# Patient Record
Sex: FEMALE | Race: WHITE | Employment: STUDENT | URBAN - METROPOLITAN AREA
[De-identification: names, ages, dates, MRNs, and addresses within clinical notes are randomized per-mention and may not be internally consistent; named-entity substitution may affect disease eponyms.]

---
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
## 2024-12-23 ENCOUNTER — OFFICE VISIT (OUTPATIENT)
Age: 19
End: 2024-12-23

## 2024-12-23 VITALS
BODY MASS INDEX: 30.36 KG/M2 | OXYGEN SATURATION: 98 % | HEART RATE: 100 BPM | DIASTOLIC BLOOD PRESSURE: 72 MMHG | WEIGHT: 165 LBS | SYSTOLIC BLOOD PRESSURE: 122 MMHG | TEMPERATURE: 97.5 F | HEIGHT: 62 IN

## 2024-12-23 DIAGNOSIS — R30.0 DYSURIA: ICD-10-CM

## 2024-12-23 DIAGNOSIS — N39.0 URINARY TRACT INFECTION WITHOUT HEMATURIA, SITE UNSPECIFIED: Primary | ICD-10-CM

## 2024-12-23 LAB
BILIRUBIN, POC: NEGATIVE
BLOOD URINE, POC: NORMAL
CLARITY, POC: CLEAR
COLOR, POC: YELLOW
GLUCOSE URINE, POC: NEGATIVE MG/DL
KETONES, POC: NEGATIVE MG/DL
LEUKOCYTE EST, POC: NORMAL
NITRITE, POC: NEGATIVE
PH, POC: 5.5
PROTEIN, POC: NEGATIVE MG/DL
SPECIFIC GRAVITY, POC: >=1.03
UROBILINOGEN, POC: 0.2 MG/DL

## 2024-12-23 RX ORDER — NORGESTIMATE AND ETHINYL ESTRADIOL 0.25-0.035
1 KIT ORAL DAILY
COMMUNITY

## 2024-12-23 RX ORDER — CEPHALEXIN 500 MG/1
500 CAPSULE ORAL 2 TIMES DAILY
Qty: 10 CAPSULE | Refills: 0 | Status: SHIPPED | OUTPATIENT
Start: 2024-12-23 | End: 2024-12-28

## 2024-12-23 NOTE — PROGRESS NOTES
Rimma العراقي (:  2005) is a 19 y.o. female,  here for evaluation of the following chief complaint(s): Dysuria (Pt states burns when she urinates and has urinary frequency /Since this morning/Pt states some abdominal pain but more achy)    Rimma العراقي is a New patient.   Last Urgent Care Visit: Visit date not found  I have reviewed the patient's medications; see Medication Reconciliation.    ASSESSMENT/PLAN:  Diagnosis:     ICD-10-CM    1. Urinary tract infection without hematuria, site unspecified  N39.0 cephALEXin (KEFLEX) 500 MG capsule      2. Dysuria  R30.0 POCT Urinalysis no Micro           Medical Decision Making:   Patient was seen at Urgent Care today for dysuria, increased urinary frequency, hesitancy,  x 1 day(s)    Based on history and physical examination, differential diagnosis includes but is not limited to: UTI, pyelonephritis, kidney stone, other g/u infections.     On exam pt is afebrile and not tachycardic. There is no CVA tenderness or vomiting. I do not have any concern for pyelonephritis.     POCT urine shows: + Leuks. This is indicative of UTI.  Chart reviewed. No prior cultures available for review.    Prescription(s) provided: Keflex    Patient was discharged home with follow-up and return precautions.     Patient's initial blood pressure was elevated greater than 130/80. BP was rechecked prior to discharge and is below 130/80. Therefore, referral to PCP is not required at this time.     Orders Placed This Encounter   Medications    cephALEXin (KEFLEX) 500 MG capsule     Sig: Take 1 capsule by mouth 2 times daily at 0800 and 1400 for 5 days     Dispense:  10 capsule     Refill:  0       Results:  Results for orders placed or performed in visit on 24   POCT Urinalysis no Micro   Result Value Ref Range    Color, UA yellow     Clarity, UA clear     Glucose, UA POC negative mg/dL    Bilirubin, UA negative     Ketones, UA negative mg/dL    Spec Grav, UA >=1.030     Blood, UA POC

## 2024-12-23 NOTE — PATIENT INSTRUCTIONS
Take antibiotic as prescribed.     Push fluids.     Follow-up with primary care or return if not improved.

## 2025-05-07 ENCOUNTER — APPOINTMENT (OUTPATIENT)
Dept: GENERAL RADIOLOGY | Age: 20
End: 2025-05-07
Payer: COMMERCIAL

## 2025-05-07 ENCOUNTER — HOSPITAL ENCOUNTER (EMERGENCY)
Age: 20
Discharge: HOME | End: 2025-05-07
Attending: STUDENT IN AN ORGANIZED HEALTH CARE EDUCATION/TRAINING PROGRAM
Payer: COMMERCIAL

## 2025-05-07 ENCOUNTER — APPOINTMENT (OUTPATIENT)
Dept: CT IMAGING | Age: 20
End: 2025-05-07
Payer: COMMERCIAL

## 2025-05-07 VITALS
DIASTOLIC BLOOD PRESSURE: 76 MMHG | TEMPERATURE: 98.8 F | OXYGEN SATURATION: 98 % | SYSTOLIC BLOOD PRESSURE: 119 MMHG | RESPIRATION RATE: 16 BRPM | HEIGHT: 60 IN | HEART RATE: 100 BPM | BODY MASS INDEX: 34.16 KG/M2 | WEIGHT: 174 LBS

## 2025-05-07 DIAGNOSIS — R07.9 CHEST PAIN, UNSPECIFIED TYPE: Primary | ICD-10-CM

## 2025-05-07 LAB
ALBUMIN SERPL-MCNC: 4.3 G/DL (ref 3.4–5)
ALBUMIN/GLOB SERPL: 1.2 {RATIO} (ref 1.1–2.2)
ALP SERPL-CCNC: 85 U/L (ref 40–129)
ALT SERPL-CCNC: 16 U/L (ref 10–40)
ANION GAP SERPL CALCULATED.3IONS-SCNC: 15 MMOL/L (ref 3–16)
AST SERPL-CCNC: 33 U/L (ref 15–37)
BASOPHILS # BLD: 0.1 K/UL (ref 0–0.2)
BASOPHILS NFR BLD: 0.5 %
BILIRUB SERPL-MCNC: 0.3 MG/DL (ref 0–1)
BUN SERPL-MCNC: 14 MG/DL (ref 7–20)
CALCIUM SERPL-MCNC: 9.8 MG/DL (ref 8.3–10.6)
CHLORIDE SERPL-SCNC: 102 MMOL/L (ref 99–110)
CO2 SERPL-SCNC: 21 MMOL/L (ref 21–32)
CREAT SERPL-MCNC: 0.8 MG/DL (ref 0.6–1.1)
D-DIMER QUANTITATIVE: 0.66 UG/ML FEU (ref 0–0.6)
DEPRECATED RDW RBC AUTO: 13 % (ref 12.4–15.4)
EKG ATRIAL RATE: 131 BPM
EKG ATRIAL RATE: 97 BPM
EKG DIAGNOSIS: NORMAL
EKG DIAGNOSIS: NORMAL
EKG P AXIS: 56 DEGREES
EKG P AXIS: 74 DEGREES
EKG P-R INTERVAL: 112 MS
EKG P-R INTERVAL: 150 MS
EKG Q-T INTERVAL: 364 MS
EKG Q-T INTERVAL: 396 MS
EKG QRS DURATION: 80 MS
EKG QRS DURATION: 82 MS
EKG QTC CALCULATION (BAZETT): 462 MS
EKG QTC CALCULATION (BAZETT): 584 MS
EKG R AXIS: 32 DEGREES
EKG R AXIS: 40 DEGREES
EKG T AXIS: 30 DEGREES
EKG T AXIS: 60 DEGREES
EKG VENTRICULAR RATE: 131 BPM
EKG VENTRICULAR RATE: 97 BPM
EOSINOPHIL # BLD: 0.1 K/UL (ref 0–0.6)
EOSINOPHIL NFR BLD: 0.4 %
FLUAV RNA RESP QL NAA+PROBE: NOT DETECTED
FLUBV RNA RESP QL NAA+PROBE: NOT DETECTED
GFR SERPLBLD CREATININE-BSD FMLA CKD-EPI: >90 ML/MIN/{1.73_M2}
GLUCOSE SERPL-MCNC: 98 MG/DL (ref 70–99)
HCG SERPL QL: NEGATIVE
HCT VFR BLD AUTO: 42.1 % (ref 36–48)
HGB BLD-MCNC: 14.2 G/DL (ref 12–16)
LACTATE BLDV-SCNC: 1 MMOL/L (ref 0.4–2)
LYMPHOCYTES # BLD: 3.2 K/UL (ref 1–5.1)
LYMPHOCYTES NFR BLD: 19.6 %
MAGNESIUM SERPL-MCNC: 2.16 MG/DL (ref 1.8–2.4)
MCH RBC QN AUTO: 28.6 PG (ref 26–34)
MCHC RBC AUTO-ENTMCNC: 33.7 G/DL (ref 31–36)
MCV RBC AUTO: 84.8 FL (ref 80–100)
MONOCYTES # BLD: 1.3 K/UL (ref 0–1.3)
MONOCYTES NFR BLD: 7.9 %
NEUTROPHILS # BLD: 11.6 K/UL (ref 1.7–7.7)
NEUTROPHILS NFR BLD: 71.6 %
PLATELET # BLD AUTO: 322 K/UL (ref 135–450)
PMV BLD AUTO: 6.8 FL (ref 5–10.5)
POTASSIUM SERPL-SCNC: 3.2 MMOL/L (ref 3.5–5.1)
POTASSIUM SERPL-SCNC: 3.7 MMOL/L (ref 3.5–5.1)
PROT SERPL-MCNC: 7.8 G/DL (ref 6.4–8.2)
RBC # BLD AUTO: 4.97 M/UL (ref 4–5.2)
SARS-COV-2 RNA RESP QL NAA+PROBE: NOT DETECTED
SODIUM SERPL-SCNC: 138 MMOL/L (ref 136–145)
TROPONIN, HIGH SENSITIVITY: 15 NG/L (ref 0–14)
TROPONIN, HIGH SENSITIVITY: 20 NG/L (ref 0–14)
TROPONIN, HIGH SENSITIVITY: 25 NG/L (ref 0–14)
WBC # BLD AUTO: 16.2 K/UL (ref 4–11)

## 2025-05-07 PROCEDURE — 2580000003 HC RX 258: Performed by: STUDENT IN AN ORGANIZED HEALTH CARE EDUCATION/TRAINING PROGRAM

## 2025-05-07 PROCEDURE — 84703 CHORIONIC GONADOTROPIN ASSAY: CPT

## 2025-05-07 PROCEDURE — 83605 ASSAY OF LACTIC ACID: CPT

## 2025-05-07 PROCEDURE — 96375 TX/PRO/DX INJ NEW DRUG ADDON: CPT

## 2025-05-07 PROCEDURE — 96365 THER/PROPH/DIAG IV INF INIT: CPT

## 2025-05-07 PROCEDURE — 85025 COMPLETE CBC W/AUTO DIFF WBC: CPT

## 2025-05-07 PROCEDURE — 6360000004 HC RX CONTRAST MEDICATION: Performed by: STUDENT IN AN ORGANIZED HEALTH CARE EDUCATION/TRAINING PROGRAM

## 2025-05-07 PROCEDURE — 84132 ASSAY OF SERUM POTASSIUM: CPT

## 2025-05-07 PROCEDURE — 80053 COMPREHEN METABOLIC PANEL: CPT

## 2025-05-07 PROCEDURE — 36415 COLL VENOUS BLD VENIPUNCTURE: CPT

## 2025-05-07 PROCEDURE — 71260 CT THORAX DX C+: CPT

## 2025-05-07 PROCEDURE — 83735 ASSAY OF MAGNESIUM: CPT

## 2025-05-07 PROCEDURE — 87636 SARSCOV2 & INF A&B AMP PRB: CPT

## 2025-05-07 PROCEDURE — 93005 ELECTROCARDIOGRAM TRACING: CPT | Performed by: STUDENT IN AN ORGANIZED HEALTH CARE EDUCATION/TRAINING PROGRAM

## 2025-05-07 PROCEDURE — 99285 EMERGENCY DEPT VISIT HI MDM: CPT

## 2025-05-07 PROCEDURE — 93010 ELECTROCARDIOGRAM REPORT: CPT | Performed by: INTERNAL MEDICINE

## 2025-05-07 PROCEDURE — 87040 BLOOD CULTURE FOR BACTERIA: CPT

## 2025-05-07 PROCEDURE — 6360000002 HC RX W HCPCS: Performed by: STUDENT IN AN ORGANIZED HEALTH CARE EDUCATION/TRAINING PROGRAM

## 2025-05-07 PROCEDURE — 84484 ASSAY OF TROPONIN QUANT: CPT

## 2025-05-07 PROCEDURE — 6370000000 HC RX 637 (ALT 250 FOR IP): Performed by: STUDENT IN AN ORGANIZED HEALTH CARE EDUCATION/TRAINING PROGRAM

## 2025-05-07 PROCEDURE — 85379 FIBRIN DEGRADATION QUANT: CPT

## 2025-05-07 PROCEDURE — 87150 DNA/RNA AMPLIFIED PROBE: CPT

## 2025-05-07 PROCEDURE — 71045 X-RAY EXAM CHEST 1 VIEW: CPT

## 2025-05-07 RX ORDER — KETOROLAC TROMETHAMINE 15 MG/ML
15 INJECTION, SOLUTION INTRAMUSCULAR; INTRAVENOUS ONCE
Status: COMPLETED | OUTPATIENT
Start: 2025-05-07 | End: 2025-05-07

## 2025-05-07 RX ORDER — SODIUM CHLORIDE, SODIUM LACTATE, POTASSIUM CHLORIDE, AND CALCIUM CHLORIDE .6; .31; .03; .02 G/100ML; G/100ML; G/100ML; G/100ML
1000 INJECTION, SOLUTION INTRAVENOUS ONCE
Status: COMPLETED | OUTPATIENT
Start: 2025-05-07 | End: 2025-05-07

## 2025-05-07 RX ORDER — MAGNESIUM SULFATE IN WATER 40 MG/ML
2000 INJECTION, SOLUTION INTRAVENOUS ONCE
Status: COMPLETED | OUTPATIENT
Start: 2025-05-07 | End: 2025-05-07

## 2025-05-07 RX ORDER — IOPAMIDOL 755 MG/ML
75 INJECTION, SOLUTION INTRAVASCULAR
Status: COMPLETED | OUTPATIENT
Start: 2025-05-07 | End: 2025-05-07

## 2025-05-07 RX ORDER — POTASSIUM CHLORIDE 1500 MG/1
40 TABLET, EXTENDED RELEASE ORAL ONCE
Status: COMPLETED | OUTPATIENT
Start: 2025-05-07 | End: 2025-05-07

## 2025-05-07 RX ADMIN — KETOROLAC TROMETHAMINE 15 MG: 15 INJECTION, SOLUTION INTRAMUSCULAR; INTRAVENOUS at 05:51

## 2025-05-07 RX ADMIN — SODIUM CHLORIDE, SODIUM LACTATE, POTASSIUM CHLORIDE, AND CALCIUM CHLORIDE 1000 ML: .6; .31; .03; .02 INJECTION, SOLUTION INTRAVENOUS at 05:51

## 2025-05-07 RX ADMIN — MAGNESIUM SULFATE HEPTAHYDRATE 2000 MG: 40 INJECTION, SOLUTION INTRAVENOUS at 04:29

## 2025-05-07 RX ADMIN — IOPAMIDOL 75 ML: 755 INJECTION, SOLUTION INTRAVENOUS at 06:07

## 2025-05-07 RX ADMIN — SODIUM CHLORIDE, SODIUM LACTATE, POTASSIUM CHLORIDE, AND CALCIUM CHLORIDE 1000 ML: .6; .31; .03; .02 INJECTION, SOLUTION INTRAVENOUS at 04:29

## 2025-05-07 RX ADMIN — POTASSIUM CHLORIDE 40 MEQ: 1500 TABLET, EXTENDED RELEASE ORAL at 05:11

## 2025-05-07 ASSESSMENT — PAIN SCALES - GENERAL
PAINLEVEL_OUTOF10: 4
PAINLEVEL_OUTOF10: 4
PAINLEVEL_OUTOF10: 0

## 2025-05-07 ASSESSMENT — PAIN DESCRIPTION - DESCRIPTORS
DESCRIPTORS: SHARP
DESCRIPTORS: SHARP

## 2025-05-07 ASSESSMENT — LIFESTYLE VARIABLES
HOW OFTEN DO YOU HAVE A DRINK CONTAINING ALCOHOL: NEVER
HOW MANY STANDARD DRINKS CONTAINING ALCOHOL DO YOU HAVE ON A TYPICAL DAY: PATIENT DOES NOT DRINK

## 2025-05-07 ASSESSMENT — PAIN DESCRIPTION - LOCATION
LOCATION: CHEST
LOCATION: CHEST

## 2025-05-07 ASSESSMENT — PAIN - FUNCTIONAL ASSESSMENT: PAIN_FUNCTIONAL_ASSESSMENT: 0-10

## 2025-05-07 NOTE — ED PROVIDER NOTES
Emergency Department Encounter    Patient: Rimma العراقي  MRN: [id number]  : 2005  Date of Evaluation: 2025  ED Provider:  Harjinder Floyd MD    Triage Chief Complaint:   Chest Pain (Pt comes from home. Pt states she woke up at 0300 with sharp chest pains and left arm tingly.)    [place] [employer]:  Rimma العراقي is a 19 y.o. female with history seen below presenting with complaints of chest pain with radiation of the left upper extremity.  Patient states just prior presentation she began having substernal chest pain which is moderate, constant, stabbing/pressure with radiation to left upper extremity states with radiation of pain in the left upper extremity she feels some very mild tingling in the left upper extremity.  Denies lower extremity edema, orthopnea or signs of fluid overload.  Denies history of DVTs or blood clots in the past.  Denies recent trauma or travel.  Denies recent surgeries.  States she has a Nexplanon in place.  Denies headache, blurred vision, any motor or sensory changes other than without radiation of pain in the left upper extremity she feels some mild tingling in the left upper extremity.  Denies abdominal pain, nausea vomiting, diarrhea constipation or urinary symptoms.  States last week she did have some nasal congestion and URI type symptoms.  States is improving.  States she can have very mild nasal congestion    ROS - see HPI, below listed is current ROS at time of my eval:  General:  No fevers, no chills, no weakness  Eyes:  No recent vison changes, no discharge  ENT:  No sore throat, + nasal congestion, no hearing changes  Cardiovascular:  + chest pain, no palpitations  Respiratory:  No shortness of breath, no cough, no wheezing  Gastrointestinal:  No pain, no nausea, no vomiting, no diarrhea  Musculoskeletal:  No muscle pain, no joint pain  Skin:  No rash, no pruritis, no easy bruising  Neurologic:  No speech problems, no headache, no extremity numbness, no extremity tingling,

## 2025-05-07 NOTE — ED NOTES
0742 called cardiology for stat consult ordered by Dr. Floyd for elevated trop. Shift change so cardiology will be speaking with Dr. Ash.    6838 Dr. Chairez from cardiology called and is speaking with Dr. Ash at this time.

## 2025-05-07 NOTE — ED PROVIDER NOTES
I assumed care of this patient from Dr. Floyd at shift change.  Please refer to his note for full H&P.  In short the patient is a healthy 19-year-old who presented with left-sided chest pressure radiating to the left shoulder.  On arrival she was tachycardic.  Initial EKG with a heart rate of 130 with some nonspecific T wave changes in the anterolateral leads.  Initial troponin elevated at 25.  It has been repeated twice and went to 20 and then 15.  CT of the chest is negative, no PE.  She is feeling much better after Toradol and her heart rate is normalized.  Discussed with Dr. Chairez with cardiology.  He has reviewed the EKG and is not concerned with the EKG findings.  He is reassured by her workup.  He thinks outpatient follow-up with PCP is appropriate.  I relayed this to the patient.  She states her PCP no longer takes her insurance and was just informed of this 2 weeks ago.  I will give her a referral for outpatient follow-up with primary care.  Strict return precautions given.    Results for orders placed or performed during the hospital encounter of 05/07/25   COVID-19 & Influenza Combo    Specimen: Nasopharyngeal Swab   Result Value Ref Range    SARS-CoV-2 RNA, RT PCR NOT DETECTED NOT DETECTED    Influenza A NOT DETECTED NOT DETECTED    Influenza B NOT DETECTED NOT DETECTED   CBC with Auto Differential   Result Value Ref Range    WBC 16.2 (H) 4.0 - 11.0 K/uL    RBC 4.97 4.00 - 5.20 M/uL    Hemoglobin 14.2 12.0 - 16.0 g/dL    Hematocrit 42.1 36.0 - 48.0 %    MCV 84.8 80.0 - 100.0 fL    MCH 28.6 26.0 - 34.0 pg    MCHC 33.7 31.0 - 36.0 g/dL    RDW 13.0 12.4 - 15.4 %    Platelets 322 135 - 450 K/uL    MPV 6.8 5.0 - 10.5 fL    Neutrophils % 71.6 %    Lymphocytes % 19.6 %    Monocytes % 7.9 %    Eosinophils % 0.4 %    Basophils % 0.5 %    Neutrophils Absolute 11.6 (H) 1.7 - 7.7 K/uL    Lymphocytes Absolute 3.2 1.0 - 5.1 K/uL    Monocytes Absolute 1.3 0.0 - 1.3 K/uL    Eosinophils Absolute 0.1 0.0 - 0.6 K/uL

## 2025-05-07 NOTE — DISCHARGE INSTRUCTIONS
Your lab work here was reassuring.  Your heart enzymes were slightly elevated but we repeated them and they normalized.  Your heart rate was elevated on arrival but this also improved.  The CAT scan of your chest was normal.  We do think you need to follow-up with primary care.  You likely need to have an outpatient echocardiogram.  I have given you a referral to establish care with a new primary care provider.  If you are worsening at any point or your symptoms return you should return to the ER immediately.

## 2025-05-07 NOTE — ED NOTES
First set blood cultures obtained via sterile technique without difficulty.  MD wanted repeat K level as well.  RN jeff that after blood cultures.  Soumya NICHOLS

## 2025-05-09 LAB — REPORT: NORMAL

## 2025-05-11 LAB
BACTERIA BLD CULT ORG #2: ABNORMAL
BACTERIA BLD CULT ORG #2: ABNORMAL
BACTERIA BLD CULT: NORMAL
ORGANISM: ABNORMAL
ORGANISM: ABNORMAL

## 2025-05-12 ENCOUNTER — OFFICE VISIT (OUTPATIENT)
Dept: PRIMARY CARE CLINIC | Age: 20
End: 2025-05-12

## 2025-05-12 VITALS
RESPIRATION RATE: 16 BRPM | HEIGHT: 60 IN | HEART RATE: 95 BPM | WEIGHT: 172.6 LBS | SYSTOLIC BLOOD PRESSURE: 116 MMHG | BODY MASS INDEX: 33.89 KG/M2 | OXYGEN SATURATION: 99 % | DIASTOLIC BLOOD PRESSURE: 74 MMHG

## 2025-05-12 DIAGNOSIS — R00.0 TACHYCARDIA: ICD-10-CM

## 2025-05-12 DIAGNOSIS — R94.31 PROLONGED QT INTERVAL: ICD-10-CM

## 2025-05-12 DIAGNOSIS — J35.1 SWOLLEN TONSIL: Primary | ICD-10-CM

## 2025-05-12 LAB — S PYO AG THROAT QL: NEGATIVE

## 2025-05-12 RX ORDER — ETONOGESTREL 68 MG/1
IMPLANT SUBCUTANEOUS
COMMUNITY

## 2025-05-12 SDOH — ECONOMIC STABILITY: FOOD INSECURITY: WITHIN THE PAST 12 MONTHS, YOU WORRIED THAT YOUR FOOD WOULD RUN OUT BEFORE YOU GOT MONEY TO BUY MORE.: NEVER TRUE

## 2025-05-12 SDOH — ECONOMIC STABILITY: FOOD INSECURITY: WITHIN THE PAST 12 MONTHS, THE FOOD YOU BOUGHT JUST DIDN'T LAST AND YOU DIDN'T HAVE MONEY TO GET MORE.: NEVER TRUE

## 2025-05-12 ASSESSMENT — PATIENT HEALTH QUESTIONNAIRE - PHQ9
SUM OF ALL RESPONSES TO PHQ QUESTIONS 1-9: 0
1. LITTLE INTEREST OR PLEASURE IN DOING THINGS: NOT AT ALL
SUM OF ALL RESPONSES TO PHQ QUESTIONS 1-9: 0
2. FEELING DOWN, DEPRESSED OR HOPELESS: NOT AT ALL

## 2025-05-12 NOTE — PROGRESS NOTES
PROGRESS NOTE   [facility]                                  [address], [address], [facility] [address]         Phone: [phone]    Date of Service:  5/12/2025     Patient ID: Annita العراقي is a 19 y.o. female      Subjective:     CC: Coming in to establish care + Acute concerns    HPI    Chest Pain / Cardiac Symptoms:  This is a 19-year-old female presenting for a ER followup where she presented with acute substernal chest pressure, radiating to the left shoulder and upper extremity, associated with mild tingling. The pain was described as moderate, constant, and pressure-like. No associated palpitations, shortness of breath, dizziness, or syncope were reported. On ED arrival, she was found to be tachycardic (). Initial EKG showed sinus tachycardia with a prolonged QTc (584 ms) and nonspecific ST/T wave abnormalities. Repeat EKG showed normalized heart rate and improved QTc (462 ms), with resolution of prior nonspecific changes.    Troponin Elevation:  Serial high-sensitivity troponins were elevated, starting at 25 ng/L ? 20 ng/L ? 15 ng/L (normal range 0-14 ng/L). Downtrend is reassuring and suggests mild myocardial injury, potentially related to recent illness or transient demand ischemia. The cardiologist reviewed these values and was not concerned based on the clinical picture.    Electrolyte Abnormality (Potassium):  Potassium levels during admission were noted to be mildly low (3.2 mmol/L), which could have contributed to the prolonged QTc on initial EKG. She received potassium replacement. It later normalized to 3.7 mmol/L. No arrhythmias were noted.    Infectious Symptoms / Possible Viral Myocarditis:  The patient reported a recent URI with mild nasal congestion over the past week, which was improving at the time of presentation. No fever, cough, or systemic symptoms currently. CBC revealed leukocytosis with

## 2025-05-13 ENCOUNTER — RESULTS FOLLOW-UP (OUTPATIENT)
Dept: PRIMARY CARE CLINIC | Age: 20
End: 2025-05-13

## 2025-05-13 LAB
ANION GAP SERPL CALCULATED.3IONS-SCNC: 12 MMOL/L (ref 3–16)
BUN SERPL-MCNC: 14 MG/DL (ref 7–20)
CALCIUM SERPL-MCNC: 10 MG/DL (ref 8.3–10.6)
CHLORIDE SERPL-SCNC: 103 MMOL/L (ref 99–110)
CO2 SERPL-SCNC: 22 MMOL/L (ref 21–32)
CREAT SERPL-MCNC: 0.7 MG/DL (ref 0.6–1.1)
DEPRECATED RDW RBC AUTO: 13.5 % (ref 12.4–15.4)
GFR SERPLBLD CREATININE-BSD FMLA CKD-EPI: >90 ML/MIN/{1.73_M2}
GLUCOSE SERPL-MCNC: 83 MG/DL (ref 70–99)
HCT VFR BLD AUTO: 40.5 % (ref 36–48)
HGB BLD-MCNC: 13.8 G/DL (ref 12–16)
MCH RBC QN AUTO: 29.1 PG (ref 26–34)
MCHC RBC AUTO-ENTMCNC: 34 G/DL (ref 31–36)
MCV RBC AUTO: 85.4 FL (ref 80–100)
PLATELET # BLD AUTO: 355 K/UL (ref 135–450)
PMV BLD AUTO: 7.4 FL (ref 5–10.5)
POTASSIUM SERPL-SCNC: 4.4 MMOL/L (ref 3.5–5.1)
RBC # BLD AUTO: 4.74 M/UL (ref 4–5.2)
SODIUM SERPL-SCNC: 137 MMOL/L (ref 136–145)
WBC # BLD AUTO: 8.2 K/UL (ref 4–11)

## 2025-05-15 LAB
BACTERIA THROAT AEROBE CULT: ABNORMAL
BACTERIA THROAT AEROBE CULT: ABNORMAL
ORGANISM: ABNORMAL

## 2025-05-16 DIAGNOSIS — J35.1 SWOLLEN TONSIL: Primary | ICD-10-CM

## 2025-05-16 NOTE — PROGRESS NOTES
Called and spoke to the patient regarding her labs results, including her throat culture. Patient continues to have swollen tonsils. Informed the patient that we will be sending antibiotics to the pharmacy. Discussed return precautions and asked to follow-up with cardiology along with completing the rest of the imaging/tests.